# Patient Record
Sex: MALE | Race: WHITE | HISPANIC OR LATINO | Employment: FULL TIME | URBAN - METROPOLITAN AREA
[De-identification: names, ages, dates, MRNs, and addresses within clinical notes are randomized per-mention and may not be internally consistent; named-entity substitution may affect disease eponyms.]

---

## 2022-04-05 ENCOUNTER — OFFICE VISIT (OUTPATIENT)
Dept: URGENT CARE | Facility: CLINIC | Age: 27
End: 2022-04-05

## 2022-04-05 VITALS
RESPIRATION RATE: 20 BRPM | TEMPERATURE: 98.5 F | HEIGHT: 66 IN | BODY MASS INDEX: 34.07 KG/M2 | HEART RATE: 90 BPM | DIASTOLIC BLOOD PRESSURE: 62 MMHG | OXYGEN SATURATION: 96 % | WEIGHT: 212 LBS | SYSTOLIC BLOOD PRESSURE: 118 MMHG

## 2022-04-05 DIAGNOSIS — K64.8 INTERNAL HEMORRHOID: ICD-10-CM

## 2022-04-05 DIAGNOSIS — K62.5 RECTAL BLEEDING: ICD-10-CM

## 2022-04-05 PROCEDURE — 99203 OFFICE O/P NEW LOW 30 MIN: CPT | Performed by: PHYSICIAN ASSISTANT

## 2022-04-05 RX ORDER — HYDROCORTISONE 25 MG/G
CREAM TOPICAL
Qty: 28 G | Refills: 0 | Status: SHIPPED | OUTPATIENT
Start: 2022-04-05

## 2022-04-05 NOTE — PROGRESS NOTES
"Subjective:   Miguel Reyes Jr. is a 27 y.o. male who presents for Rectal Bleeding (x2.5 weeks, rectal bleeding before having a stool movement, dark blood, no rectal pain, Lt flank sharp pain)      HPI  Patient is a 27-year-old male who presents to clinic with complaints of rectal bleeding onset 2.5 weeks ago.  Rectal bleeding is described as intermittent dark red blood per rectum as streaks on the stool or the toilet paper.  He feels a mild pressure to the rectal area but no pain.  He reports of no black stools.  He has not recently having regular BMs.  Denies any straining or constipation.  He admits he started lifting weights 2.5 weeks ago as well. Denies any fever, chills, abdominal pain, nausea, vomiting, diarrhea.  Denies history of hemorrhoids or colitis.      Medications:    • This patient does not have an active medication from one of the medication groupers.    Allergies: Patient has no known allergies.    Problem List: Miguel Reyes Jr. does not have a problem list on file.    Surgical History:  Past Surgical History:   Procedure Laterality Date   • OTHER  05/1996    Lt side kidney surgery       Past Social Hx: Miguel Reyes Jr.  reports that he has never smoked. He has never used smokeless tobacco. He reports current alcohol use. He reports that he does not use drugs.     Past Family Hx:  Miguel Reyes Jr. family history is not on file.     Problem list, medications, and allergies reviewed by myself today in Epic.     Objective:     /62 (BP Location: Left arm, Patient Position: Sitting, BP Cuff Size: Adult)   Pulse 90   Temp 36.9 °C (98.5 °F) (Temporal)   Resp 20   Ht 1.676 m (5' 6\")   Wt 96.2 kg (212 lb)   SpO2 96%   BMI 34.22 kg/m²     Physical Exam  Vitals reviewed.   Constitutional:       General: He is not in acute distress.     Appearance: Normal appearance. He is not ill-appearing or toxic-appearing.   Eyes:      Conjunctiva/sclera: Conjunctivae normal.      Pupils: Pupils are equal, " round, and reactive to light.   Cardiovascular:      Rate and Rhythm: Normal rate and regular rhythm.      Heart sounds: Normal heart sounds.   Pulmonary:      Effort: Pulmonary effort is normal. No respiratory distress.      Breath sounds: Normal breath sounds. No wheezing, rhonchi or rales.   Abdominal:      General: Abdomen is flat. Bowel sounds are normal. There is no distension.      Palpations: Abdomen is soft. There is no mass.      Tenderness: There is no abdominal tenderness. There is no guarding or rebound.   Genitourinary:     Rectum: Internal hemorrhoid (small palpable. no active bleeding ) present. No mass, tenderness or anal fissure.   Musculoskeletal:      Cervical back: Neck supple.   Skin:     General: Skin is warm and dry.   Neurological:      General: No focal deficit present.      Mental Status: He is alert and oriented to person, place, and time.   Psychiatric:         Mood and Affect: Mood normal.         Behavior: Behavior normal.         Diagnosis and associated orders:     1. Rectal bleeding  - Referral to Gastroenterology    2. Internal hemorrhoid  - hydrocortisone rectal (PERIANAL) 2.5% Cream; Apply to external affected area 2-4 times per day after bowel movement as needed  Dispense: 28 g; Refill: 0  - Referral to Gastroenterology       Comments/MDM:     • The patient's presenting symptoms and exam findings most likely consistent with a symptomatic internal hemorrhoid.  Patient did show me pictures of his bloody stools per rectum.  Recommend perianal cream, warm sitz bath's, increase fiber intake, increase fluid intake, avoid straining, heavy lifting, or squats.  Patient would like referral to specialist since this has been occurring for 2.5 weeks.  Discussed differential diagnosis.  • AVS printed for patient review.       I personally reviewed prior external notes and test results pertinent to today's visit. Red flags discussed and indications to present to the Emergency Department.  Supportive care, natural history, differential diagnoses, and indications for immediate follow-up discussed. Patient expresses understanding and agrees to plan. Patient denies any other questions or concerns.     Follow-up with the primary care physician for recheck, reevaluation, and consideration of further management.    Please note that this dictation was created using voice recognition software. I have made a reasonable attempt to correct obvious errors, but I expect that there are errors of grammar and possibly content that I did not discover before finalizing the note.    This note was electronically signed by Kikr Zhao PA-C